# Patient Record
Sex: FEMALE | URBAN - METROPOLITAN AREA
[De-identification: names, ages, dates, MRNs, and addresses within clinical notes are randomized per-mention and may not be internally consistent; named-entity substitution may affect disease eponyms.]

---

## 2023-08-03 ENCOUNTER — TELEPHONE (OUTPATIENT)
Dept: UROGYNECOLOGY | Facility: CLINIC | Age: 68
End: 2023-08-03

## 2023-08-03 NOTE — TELEPHONE ENCOUNTER
Called patient to schedule appointment with Dr Thompson. Patient stated she just put her mom in a nursing home yesterday and she will call this office to schedule at a later [email or web address]